# Patient Record
Sex: MALE | Race: WHITE | Employment: OTHER | URBAN - METROPOLITAN AREA
[De-identification: names, ages, dates, MRNs, and addresses within clinical notes are randomized per-mention and may not be internally consistent; named-entity substitution may affect disease eponyms.]

---

## 2017-06-22 ENCOUNTER — HOSPITAL ENCOUNTER (OUTPATIENT)
Dept: LAB | Facility: MEDICAL CENTER | Age: 67
End: 2017-06-22
Attending: SPECIALIST
Payer: COMMERCIAL

## 2017-06-22 LAB
ALBUMIN SERPL BCP-MCNC: 4.1 G/DL (ref 3.2–4.9)
ALBUMIN/GLOB SERPL: 1.3 G/DL
ALP SERPL-CCNC: 65 U/L (ref 30–99)
ALT SERPL-CCNC: 16 U/L (ref 2–50)
ANION GAP SERPL CALC-SCNC: 6 MMOL/L (ref 0–11.9)
AST SERPL-CCNC: 29 U/L (ref 12–45)
BASOPHILS # BLD AUTO: 1.2 % (ref 0–1.8)
BASOPHILS # BLD: 0.07 K/UL (ref 0–0.12)
BILIRUB SERPL-MCNC: 0.3 MG/DL (ref 0.1–1.5)
BUN SERPL-MCNC: 12 MG/DL (ref 8–22)
CALCIUM SERPL-MCNC: 9.1 MG/DL (ref 8.5–10.5)
CEA SERPL-MCNC: 6.8 NG/ML (ref 0–3)
CHLORIDE SERPL-SCNC: 107 MMOL/L (ref 96–112)
CO2 SERPL-SCNC: 21 MMOL/L (ref 20–33)
CREAT SERPL-MCNC: 1.08 MG/DL (ref 0.5–1.4)
EOSINOPHIL # BLD AUTO: 0.27 K/UL (ref 0–0.51)
EOSINOPHIL NFR BLD: 4.4 % (ref 0–6.9)
ERYTHROCYTE [DISTWIDTH] IN BLOOD BY AUTOMATED COUNT: 40.6 FL (ref 35.9–50)
GFR SERPL CREATININE-BSD FRML MDRD: >60 ML/MIN/1.73 M 2
GLOBULIN SER CALC-MCNC: 3.1 G/DL (ref 1.9–3.5)
GLUCOSE SERPL-MCNC: 73 MG/DL (ref 65–99)
HCT VFR BLD AUTO: 46.4 % (ref 42–52)
HGB BLD-MCNC: 15 G/DL (ref 14–18)
IMM GRANULOCYTES # BLD AUTO: 0.01 K/UL (ref 0–0.11)
IMM GRANULOCYTES NFR BLD AUTO: 0.2 % (ref 0–0.9)
INR PPP: 0.99 (ref 0.87–1.13)
LYMPHOCYTES # BLD AUTO: 2.24 K/UL (ref 1–4.8)
LYMPHOCYTES NFR BLD: 36.9 % (ref 22–41)
MCH RBC QN AUTO: 27.5 PG (ref 27–33)
MCHC RBC AUTO-ENTMCNC: 32.3 G/DL (ref 33.7–35.3)
MCV RBC AUTO: 85 FL (ref 81.4–97.8)
MONOCYTES # BLD AUTO: 0.67 K/UL (ref 0–0.85)
MONOCYTES NFR BLD AUTO: 11 % (ref 0–13.4)
NEUTROPHILS # BLD AUTO: 2.81 K/UL (ref 1.82–7.42)
NEUTROPHILS NFR BLD: 46.3 % (ref 44–72)
NRBC # BLD AUTO: 0 K/UL
NRBC BLD AUTO-RTO: 0 /100 WBC
PLATELET # BLD AUTO: 210 K/UL (ref 164–446)
PMV BLD AUTO: 10.5 FL (ref 9–12.9)
POTASSIUM SERPL-SCNC: 4.7 MMOL/L (ref 3.6–5.5)
PROT SERPL-MCNC: 7.2 G/DL (ref 6–8.2)
PROTHROMBIN TIME: 13.4 SEC (ref 12–14.6)
RBC # BLD AUTO: 5.46 M/UL (ref 4.7–6.1)
SODIUM SERPL-SCNC: 134 MMOL/L (ref 135–145)
WBC # BLD AUTO: 6.1 K/UL (ref 4.8–10.8)

## 2017-06-22 PROCEDURE — 85610 PROTHROMBIN TIME: CPT

## 2017-06-22 PROCEDURE — 36415 COLL VENOUS BLD VENIPUNCTURE: CPT

## 2017-06-22 PROCEDURE — 85025 COMPLETE CBC W/AUTO DIFF WBC: CPT

## 2017-06-22 PROCEDURE — 80053 COMPREHEN METABOLIC PANEL: CPT

## 2017-06-22 PROCEDURE — 82378 CARCINOEMBRYONIC ANTIGEN: CPT

## 2017-06-23 ENCOUNTER — HOSPITAL ENCOUNTER (OUTPATIENT)
Dept: RADIOLOGY | Facility: MEDICAL CENTER | Age: 67
End: 2017-06-23
Attending: FAMILY MEDICINE
Payer: COMMERCIAL

## 2017-06-23 DIAGNOSIS — K62.89 RECTAL MASS: ICD-10-CM

## 2017-06-23 PROCEDURE — 700117 HCHG RX CONTRAST REV CODE 255: Performed by: FAMILY MEDICINE

## 2017-06-23 PROCEDURE — 71260 CT THORAX DX C+: CPT

## 2017-06-23 RX ADMIN — IOHEXOL 100 ML: 350 INJECTION, SOLUTION INTRAVENOUS at 17:45

## 2017-08-07 ENCOUNTER — HOSPITAL ENCOUNTER (OUTPATIENT)
Dept: LAB | Facility: MEDICAL CENTER | Age: 67
End: 2017-08-07
Attending: PHYSICIAN ASSISTANT
Payer: COMMERCIAL

## 2017-08-07 LAB
APPEARANCE UR: CLEAR
BILIRUB UR QL STRIP.AUTO: NEGATIVE
COLOR UR: YELLOW
GLUCOSE UR STRIP.AUTO-MCNC: NEGATIVE MG/DL
KETONES UR STRIP.AUTO-MCNC: NEGATIVE MG/DL
LEUKOCYTE ESTERASE UR QL STRIP.AUTO: NEGATIVE
MICRO URNS: NORMAL
NITRITE UR QL STRIP.AUTO: NEGATIVE
PH UR STRIP.AUTO: 5.5 [PH]
PROT UR QL STRIP: NEGATIVE MG/DL
RBC UR QL AUTO: NEGATIVE
SP GR UR STRIP.AUTO: 1.03
UROBILINOGEN UR STRIP.AUTO-MCNC: 1 MG/DL

## 2017-08-07 PROCEDURE — 87086 URINE CULTURE/COLONY COUNT: CPT

## 2017-08-07 PROCEDURE — 81003 URINALYSIS AUTO W/O SCOPE: CPT

## 2017-08-09 LAB
BACTERIA UR CULT: NORMAL
SIGNIFICANT IND 70042: NORMAL
SOURCE SOURCE: NORMAL

## 2021-03-03 DIAGNOSIS — Z23 NEED FOR VACCINATION: ICD-10-CM

## 2021-10-13 ENCOUNTER — OFFICE VISIT (OUTPATIENT)
Dept: MEDICAL GROUP | Facility: MEDICAL CENTER | Age: 71
End: 2021-10-13
Payer: MEDICARE

## 2021-10-13 ENCOUNTER — TELEPHONE (OUTPATIENT)
Dept: SCHEDULING | Facility: IMAGING CENTER | Age: 71
End: 2021-10-13

## 2021-10-13 VITALS
OXYGEN SATURATION: 94 % | HEART RATE: 63 BPM | DIASTOLIC BLOOD PRESSURE: 70 MMHG | HEIGHT: 71 IN | TEMPERATURE: 98.5 F | BODY MASS INDEX: 34.23 KG/M2 | SYSTOLIC BLOOD PRESSURE: 130 MMHG | WEIGHT: 244.49 LBS

## 2021-10-13 DIAGNOSIS — Z23 NEED FOR VACCINATION: ICD-10-CM

## 2021-10-13 DIAGNOSIS — C61 PRIMARY MALIGNANT NEOPLASM OF PROSTATE (HCC): ICD-10-CM

## 2021-10-13 DIAGNOSIS — Z93.2 ILEOSTOMY STATUS (HCC): ICD-10-CM

## 2021-10-13 DIAGNOSIS — Z83.3 FAMILY HISTORY OF DIABETES MELLITUS (DM): ICD-10-CM

## 2021-10-13 DIAGNOSIS — Z76.89 ENCOUNTER TO ESTABLISH CARE WITH NEW DOCTOR: ICD-10-CM

## 2021-10-13 DIAGNOSIS — G89.11 ACUTE LOW BACK PAIN DUE TO TRAUMA: ICD-10-CM

## 2021-10-13 DIAGNOSIS — E66.9 OBESITY WITH BODY MASS INDEX 30 OR GREATER: ICD-10-CM

## 2021-10-13 DIAGNOSIS — T45.1X5A NEUROPATHY DUE TO CHEMOTHERAPEUTIC DRUG (HCC): ICD-10-CM

## 2021-10-13 DIAGNOSIS — G62.0 NEUROPATHY DUE TO CHEMOTHERAPEUTIC DRUG (HCC): ICD-10-CM

## 2021-10-13 DIAGNOSIS — Z11.59 NEED FOR HEPATITIS C SCREENING TEST: ICD-10-CM

## 2021-10-13 DIAGNOSIS — M54.50 ACUTE LOW BACK PAIN DUE TO TRAUMA: ICD-10-CM

## 2021-10-13 DIAGNOSIS — C20 MALIGNANT NEOPLASM OF RECTUM (HCC): ICD-10-CM

## 2021-10-13 PROCEDURE — 90662 IIV NO PRSV INCREASED AG IM: CPT | Performed by: STUDENT IN AN ORGANIZED HEALTH CARE EDUCATION/TRAINING PROGRAM

## 2021-10-13 PROCEDURE — 99204 OFFICE O/P NEW MOD 45 MIN: CPT | Mod: 25 | Performed by: STUDENT IN AN ORGANIZED HEALTH CARE EDUCATION/TRAINING PROGRAM

## 2021-10-13 PROCEDURE — G0008 ADMIN INFLUENZA VIRUS VAC: HCPCS | Performed by: STUDENT IN AN ORGANIZED HEALTH CARE EDUCATION/TRAINING PROGRAM

## 2021-10-13 ASSESSMENT — PATIENT HEALTH QUESTIONNAIRE - PHQ9: CLINICAL INTERPRETATION OF PHQ2 SCORE: 0
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
## 2021-10-14 ENCOUNTER — HOSPITAL ENCOUNTER (OUTPATIENT)
Dept: LAB | Facility: MEDICAL CENTER | Age: 71
End: 2021-10-14
Attending: STUDENT IN AN ORGANIZED HEALTH CARE EDUCATION/TRAINING PROGRAM
Payer: MEDICARE

## 2021-10-14 ENCOUNTER — HOSPITAL ENCOUNTER (OUTPATIENT)
Dept: RADIOLOGY | Facility: MEDICAL CENTER | Age: 71
End: 2021-10-14
Attending: STUDENT IN AN ORGANIZED HEALTH CARE EDUCATION/TRAINING PROGRAM
Payer: MEDICARE

## 2021-10-14 DIAGNOSIS — M54.50 ACUTE LOW BACK PAIN DUE TO TRAUMA: ICD-10-CM

## 2021-10-14 DIAGNOSIS — E66.9 OBESITY WITH BODY MASS INDEX 30 OR GREATER: ICD-10-CM

## 2021-10-14 DIAGNOSIS — G89.11 ACUTE LOW BACK PAIN DUE TO TRAUMA: ICD-10-CM

## 2021-10-14 DIAGNOSIS — Z11.59 NEED FOR HEPATITIS C SCREENING TEST: ICD-10-CM

## 2021-10-14 DIAGNOSIS — Z83.3 FAMILY HISTORY OF DIABETES MELLITUS (DM): ICD-10-CM

## 2021-10-14 PROBLEM — M51.36 DDD (DEGENERATIVE DISC DISEASE), LUMBAR: Status: ACTIVE | Noted: 2021-10-14

## 2021-10-14 PROBLEM — M51.369 DDD (DEGENERATIVE DISC DISEASE), LUMBAR: Status: ACTIVE | Noted: 2021-10-14

## 2021-10-14 LAB
CHOLEST SERPL-MCNC: 202 MG/DL (ref 100–199)
FASTING STATUS PATIENT QL REPORTED: NORMAL
HDLC SERPL-MCNC: 54 MG/DL
LDLC SERPL CALC-MCNC: 130 MG/DL
TRIGL SERPL-MCNC: 91 MG/DL (ref 0–149)
TSH SERPL DL<=0.005 MIU/L-ACNC: 1.13 UIU/ML (ref 0.38–5.33)

## 2021-10-14 PROCEDURE — 80061 LIPID PANEL: CPT

## 2021-10-14 PROCEDURE — 84443 ASSAY THYROID STIM HORMONE: CPT | Mod: GA

## 2021-10-14 PROCEDURE — 86803 HEPATITIS C AB TEST: CPT

## 2021-10-14 PROCEDURE — 36415 COLL VENOUS BLD VENIPUNCTURE: CPT | Mod: GA

## 2021-10-14 PROCEDURE — 83036 HEMOGLOBIN GLYCOSYLATED A1C: CPT | Mod: GA

## 2021-10-14 PROCEDURE — 72110 X-RAY EXAM L-2 SPINE 4/>VWS: CPT

## 2021-10-15 PROBLEM — R73.03 PREDIABETES: Status: ACTIVE | Noted: 2021-10-15

## 2021-10-15 LAB
EST. AVERAGE GLUCOSE BLD GHB EST-MCNC: 126 MG/DL
HBA1C MFR BLD: 6 % (ref 4–5.6)
HCV AB SER QL: NORMAL

## 2021-11-16 ENCOUNTER — OFFICE VISIT (OUTPATIENT)
Dept: MEDICAL GROUP | Facility: MEDICAL CENTER | Age: 71
End: 2021-11-16
Payer: MEDICARE

## 2021-11-16 VITALS
SYSTOLIC BLOOD PRESSURE: 128 MMHG | HEART RATE: 70 BPM | BODY MASS INDEX: 34.57 KG/M2 | TEMPERATURE: 95.3 F | WEIGHT: 246.91 LBS | HEIGHT: 71 IN | DIASTOLIC BLOOD PRESSURE: 62 MMHG | OXYGEN SATURATION: 95 %

## 2021-11-16 DIAGNOSIS — C20 MALIGNANT NEOPLASM OF RECTUM (HCC): ICD-10-CM

## 2021-11-16 DIAGNOSIS — M79.671 CHRONIC FOOT PAIN, RIGHT: ICD-10-CM

## 2021-11-16 DIAGNOSIS — E66.9 OBESITY WITH BODY MASS INDEX 30 OR GREATER: ICD-10-CM

## 2021-11-16 DIAGNOSIS — E78.00 PURE HYPERCHOLESTEROLEMIA: ICD-10-CM

## 2021-11-16 DIAGNOSIS — C61 PRIMARY MALIGNANT NEOPLASM OF PROSTATE (HCC): ICD-10-CM

## 2021-11-16 DIAGNOSIS — R73.03 PREDIABETES: ICD-10-CM

## 2021-11-16 DIAGNOSIS — G89.29 CHRONIC FOOT PAIN, RIGHT: ICD-10-CM

## 2021-11-16 PROBLEM — E78.5 HYPERLIPIDEMIA: Status: ACTIVE | Noted: 2021-11-16

## 2021-11-16 PROCEDURE — 99214 OFFICE O/P EST MOD 30 MIN: CPT | Performed by: STUDENT IN AN ORGANIZED HEALTH CARE EDUCATION/TRAINING PROGRAM
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
## 2021-11-17 ENCOUNTER — HOSPITAL ENCOUNTER (OUTPATIENT)
Dept: RADIOLOGY | Facility: MEDICAL CENTER | Age: 71
End: 2021-11-17
Attending: STUDENT IN AN ORGANIZED HEALTH CARE EDUCATION/TRAINING PROGRAM
Payer: MEDICARE

## 2021-11-17 DIAGNOSIS — G89.29 CHRONIC FOOT PAIN, RIGHT: ICD-10-CM

## 2021-11-17 DIAGNOSIS — M79.671 CHRONIC FOOT PAIN, RIGHT: ICD-10-CM

## 2021-11-17 PROCEDURE — 73630 X-RAY EXAM OF FOOT: CPT | Mod: RT

## 2022-03-29 ENCOUNTER — TELEPHONE (OUTPATIENT)
Dept: MEDICAL GROUP | Facility: MEDICAL CENTER | Age: 72
End: 2022-03-29

## 2022-03-29 NOTE — TELEPHONE ENCOUNTER
Phone Number Called: [phone] (home)       Call outcome: Spoke to patient regarding message below.    Message: PT forgot appt. will call back when he has a better time TS

## 2022-04-15 ENCOUNTER — OFFICE VISIT (OUTPATIENT)
Dept: MEDICAL GROUP | Facility: MEDICAL CENTER | Age: 72
End: 2022-04-15
Payer: MEDICARE

## 2022-04-15 VITALS
HEART RATE: 65 BPM | RESPIRATION RATE: 16 BRPM | SYSTOLIC BLOOD PRESSURE: 126 MMHG | DIASTOLIC BLOOD PRESSURE: 64 MMHG | BODY MASS INDEX: 33.3 KG/M2 | WEIGHT: 237.88 LBS | OXYGEN SATURATION: 95 % | HEIGHT: 71 IN | TEMPERATURE: 97 F

## 2022-04-15 DIAGNOSIS — T45.1X5A NEUROPATHY DUE TO CHEMOTHERAPEUTIC DRUG (HCC): ICD-10-CM

## 2022-04-15 DIAGNOSIS — E78.00 PURE HYPERCHOLESTEROLEMIA: ICD-10-CM

## 2022-04-15 DIAGNOSIS — R73.03 PREDIABETES: ICD-10-CM

## 2022-04-15 DIAGNOSIS — Z13.79 GENETIC SCREENING: ICD-10-CM

## 2022-04-15 DIAGNOSIS — Z23 NEED FOR VACCINATION: ICD-10-CM

## 2022-04-15 DIAGNOSIS — C61 PRIMARY MALIGNANT NEOPLASM OF PROSTATE (HCC): ICD-10-CM

## 2022-04-15 DIAGNOSIS — L29.9 SCALP ITCH: ICD-10-CM

## 2022-04-15 DIAGNOSIS — C20 MALIGNANT NEOPLASM OF RECTUM (HCC): ICD-10-CM

## 2022-04-15 DIAGNOSIS — G62.0 NEUROPATHY DUE TO CHEMOTHERAPEUTIC DRUG (HCC): ICD-10-CM

## 2022-04-15 PROCEDURE — 99214 OFFICE O/P EST MOD 30 MIN: CPT | Mod: 25 | Performed by: STUDENT IN AN ORGANIZED HEALTH CARE EDUCATION/TRAINING PROGRAM

## 2022-04-15 PROCEDURE — 90715 TDAP VACCINE 7 YRS/> IM: CPT | Performed by: STUDENT IN AN ORGANIZED HEALTH CARE EDUCATION/TRAINING PROGRAM

## 2022-04-15 PROCEDURE — 90472 IMMUNIZATION ADMIN EACH ADD: CPT | Performed by: STUDENT IN AN ORGANIZED HEALTH CARE EDUCATION/TRAINING PROGRAM

## 2022-04-15 PROCEDURE — 90732 PPSV23 VACC 2 YRS+ SUBQ/IM: CPT | Performed by: STUDENT IN AN ORGANIZED HEALTH CARE EDUCATION/TRAINING PROGRAM

## 2022-04-15 PROCEDURE — G0009 ADMIN PNEUMOCOCCAL VACCINE: HCPCS | Performed by: STUDENT IN AN ORGANIZED HEALTH CARE EDUCATION/TRAINING PROGRAM

## 2022-04-15 RX ORDER — UBIDECARENONE 75 MG
100 CAPSULE ORAL DAILY
COMMUNITY

## 2022-04-15 ASSESSMENT — PATIENT HEALTH QUESTIONNAIRE - PHQ9: CLINICAL INTERPRETATION OF PHQ2 SCORE: 0

## 2022-04-15 NOTE — PROGRESS NOTES
Subjective:     CC: follow up    HPI:   Dakota presents today for follow up    Problem   Scalp Itch    Chronic condition for 20+ years. He reports persistent itching to his scalp. Has tried Head and Shoulders without relief. Would like referral to see dermatologist.     Hyperlipidemia    Chronic condition. We discussed statin therapy previously and patient preferred to continue with lifestyle modifications.     Prediabetes    New diagnosis from 10/2021. Patient likes to eat pastries on a regular daily basis. He has been trying to eat more veggies. He does not regularly exercise.     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 He sees heme/oncology Dr. Mely Collazo MD: last seen 03/2022, next appt in 6 months. He sees radiation oncology: last seen 03/2022 by LORENZO Liang, follows with them q6mos until 5 yrs post treatment, next appt in 6 months.  He saw colon and rectal surgery Dr. Ronak Benjamin last seen 07/2021, no f/u needed. He will have screening colonoscopy planned for 10/2022.    Summary per oncology:  History of rectal cancer, [id number] and prostate cancer, [id number].   For his rectal cancer, he completed neoadjuvant chemoradiation Fall 2017, then was taken to the operating room February 01, 2018.  Pathology from recent surgery revealed a complete pathologic response. There was no residual invasive carcinoma. Seven lymph nodes were negative.  Margins were negative.  Partial omentectomy showed benign adipose tissue.  Patient started adjuvant chemotherapy (Xelox) on March 15, 2018.  He finished adjuvant therapy first week of August 2018.      Pt underwent ileostomy reversal on Friday, Feb 8, 2019.     Colonoscopy was done May 1, 2019 normal.     Malignant Neoplasm of Rectum (Hcc)    Chronic issue  since 2017, stage IIIB, T2, N2b, M0 adenocarcinoma of the high rectum and intermediate risk prostate cancer, cT2a, [place] 4+3=7, followed by oncology team every 6 months at [facility] in [place]. He sees heme/oncology Dr. Mely Collazo MD: last seen 03/2022, next appt in 6 months. He sees radiation oncology: last seen 03/2022 by LORENZO Liang, follows with them q6mos until 5 yrs post treatment, next appt in 6 months.  He saw colon and rectal surgery Dr. Ronak Benjamin last seen 07/2021, no f/u needed. He will have screening colonoscopy planned for 10/2022.    Summary per oncology:  History of rectal cancer, [id number] and prostate cancer, [id number].   For his rectal cancer, he completed neoadjuvant chemoradiation Fall 2017, then was taken to the operating room February 01, 2018.  Pathology from recent surgery revealed a complete pathologic response. There was no residual invasive carcinoma. Seven lymph nodes were negative.  Margins were negative.  Partial omentectomy showed benign adipose tissue.  Patient started adjuvant chemotherapy (Xelox) on March 15, 2018.  He finished adjuvant therapy first week of August 2018.      Pt underwent ileostomy reversal on Friday, Feb 8, 2019.     Colonoscopy was done May 1, 2019 normal.       Neuropathy Due to Chemotherapeutic Drug (Hcc)    Chronic condition for 3+ years. He reports persistent numbness and tingling to hands and feet which sometimes affects his ambulation. He has been taking vitamin B12 100mcg daily. He would like to avoid medications due to potential side effects.         Current Outpatient Medications Ordered in Epic   Medication Sig Dispense Refill   • Calcium Carb-Cholecalciferol (CALCIUM 500 +D PO) Take  by mouth.     • Multiple Vitamin (MULTI-VITAMIN) Tab Take 1 Tablet by mouth every day.     • cyanocobalamin (VITAMIN B-12) 100 MCG Tab Take 100 mcg by mouth every day.     • polyethylene glycol-electrolytes (GOLYTELY) 236 GM Recon Soln Drink 1st portion of prep at  "6 PM the evening before. 2nd portion must be started 3 hours before and finished 2 hours prior to report time       No current Epic-ordered facility-administered medications on file.     SH: used to work in [employer] floor x 13 yrs (quit in 2003) and also previously worked as a , lives by self at home (no family members around), former intermittent smoker 1/2 ppd x 30yrs quit 2011, occasional EtOH 3 beers monthly, denies illicit drug use     FH: Mother and sister both have diabetes.     Health Maintenance: reviewed and discussed with patient  Vaccines: due for PPSV23, Tdap, Shingrix, COVID-19 #3 received 11/2021, received flu 10/2021    Patient desires vaccine Tdap and PPSV today. Denies recent sickness. Never had allergic reaction to this vaccine in the past.     ROS:  Gen: no fevers/chills, no changes in weight  Pulm: no sob, no cough  CV: no chest pain, no palpitations  GI: no nausea/vomiting, no diarrhea  MSk: + right foot pain  Skin: + itching scalp  Neuro: no headaches, + chronic numbness/tingling in arms and feet    Objective:     Exam:  /64 (BP Location: Left arm, Patient Position: Sitting, BP Cuff Size: Adult)   Pulse 65   Temp 36.1 °C (97 °F) (Temporal)   Resp 16   Ht 1.803 m (5' 11\")   Wt 108 kg (237 lb 14 oz)   SpO2 95%   BMI 33.18 kg/m²  Body mass index is 33.18 kg/m².    General: Normal appearing, obese. No distress.  Pulmonary: Clear to ausculation. Normal effort. No rales, ronchi, or wheezing.  Cardiovascular: Regular rate and rhythm without murmur.  Neurologic: Grossly nonfocal. Lower extremities with 5/5 strength and subjective numbness/tingling up to mid-shin.  Skin: Warm and dry. No obvious lesions on scalp noted.  Musculoskeletal: Normal gait. No extremity cyanosis, clubbing, or edema.    Labs:   Lab Results   Component Value Date/Time    WBC 6.1 06/22/2017 04:00 PM    RBC 5.46 06/22/2017 04:00 PM    HEMOGLOBIN 15.0 06/22/2017 04:00 PM    HEMATOCRIT 46.4 06/22/2017 04:00 PM "    MCV 85.0 06/22/2017 04:00 PM    MCH 27.5 06/22/2017 04:00 PM    MCHC 32.3 (L) 06/22/2017 04:00 PM    RDW 40.6 06/22/2017 04:00 PM    PLATELETCT 210 06/22/2017 04:00 PM    MPV 10.5 06/22/2017 04:00 PM      Lab Results   Component Value Date/Time    SODIUM 134 (L) 06/22/2017 04:00 PM    POTASSIUM 4.7 06/22/2017 04:00 PM    CHLORIDE 107 06/22/2017 04:00 PM    CO2 21 06/22/2017 04:00 PM    ANION 6.0 06/22/2017 04:00 PM    GLUCOSE 73 06/22/2017 04:00 PM    BUN 12 06/22/2017 04:00 PM    CREATININE 1.08 06/22/2017 04:00 PM    CALCIUM 9.1 06/22/2017 04:00 PM    ASTSGOT 29 06/22/2017 04:00 PM    ALTSGPT 16 06/22/2017 04:00 PM    TBILIRUBIN 0.3 06/22/2017 04:00 PM    ALBUMIN 4.1 06/22/2017 04:00 PM    ALBUMIN 4.45 08/17/2011 12:37 PM    TOTPROTEIN 7.2 06/22/2017 04:00 PM    TOTPROTEIN 7.40 08/17/2011 12:37 PM    GLOBULIN 3.1 06/22/2017 04:00 PM    AGRATIO 1.3 06/22/2017 04:00 PM       Assessment & Plan:     71 y.o. male with the following -     1. Neuropathy due to chemotherapeutic drug (HCC)  Chronic condition, persistent. This is felt to be chemotherapy-induced. We discussed medication options for symptom relief such as gabapentin and patient would like to avoid medication at this time. We will check some labs prior to next visit. May continue vitamin B12 100mcg daily.   - FOLATE; Future  - VITAMIN B1; Future  - VITAMIN B12; Future  - VITAMIN B6; Future    2. Malignant neoplasm of rectum (HCC)  Chronic condition, stable. Followed by oncology/hematology and radiation oncology at [facility].  - cont follow up with oncology/hematology in 6 months for repeat labs and imaging studies  - Comp Metabolic Panel; Future  - CBC WITH DIFFERENTIAL; Future    3. Primary malignant neoplasm of prostate (HCC)  Chronic condition, stable. Followed by oncology/hematology and radiation oncology at [facility].  - cont follow up with radiation oncology in 6 months for repeat PSA and screening colonoscopy  - Comp Metabolic Panel; Future  -  CBC WITH DIFFERENTIAL; Future    4. Scalp itch  Chronic condition. Will send to dermatology for evaluation.  - Referral to Dermatology    5. Prediabetes  Chronic condition, stable. Plan to reassess.  - advised to maintain a healthy weight, regular aerobic exercise, and eating healthy diet  - HEMOGLOBIN A1C; Future    6. Pure hypercholesterolemia  Chronic condition, stable. Plan to reassess.  - advised to maintain a healthy weight, regular aerobic exercise, and eating diets lower in saturated fats  - Lipid Profile; Future    7. Need for vaccination  - Tdap Vaccine =>6YO IM  - PneumoVax PPV23 =>2yo    8. Genetic screening  - Referral to Genetic Research Studies      Return in about 7 months (around 11/15/2022).    Please note that this dictation was created using voice recognition software. I have made every reasonable attempt to correct obvious errors, but I expect that there are errors of grammar and possibly content that I did not discover before finalizing the note.

## 2022-08-02 ENCOUNTER — RESEARCH ENCOUNTER (OUTPATIENT)
Dept: MEDICAL GROUP | Facility: PHYSICIAN GROUP | Age: 72
End: 2022-08-02
Attending: STUDENT IN AN ORGANIZED HEALTH CARE EDUCATION/TRAINING PROGRAM
Payer: MEDICARE

## 2022-08-02 DIAGNOSIS — Z00.6 RESEARCH STUDY PATIENT: ICD-10-CM

## 2022-08-02 NOTE — RESEARCH NOTE
[employer] [place] [employer] enrollment and kit pickup;  Tracking Number: [id number]

## 2022-11-04 ENCOUNTER — PATIENT MESSAGE (OUTPATIENT)
Dept: HEALTH INFORMATION MANAGEMENT | Facility: OTHER | Age: 72
End: 2022-11-04

## 2023-03-28 ENCOUNTER — TELEPHONE (OUTPATIENT)
Dept: HEALTH INFORMATION MANAGEMENT | Facility: OTHER | Age: 73
End: 2023-03-28
Payer: MEDICARE

## 2023-09-22 ENCOUNTER — DOCUMENTATION (OUTPATIENT)
Dept: HEALTH INFORMATION MANAGEMENT | Facility: OTHER | Age: 73
End: 2023-09-22
Payer: MEDICARE

## 2024-05-02 ENCOUNTER — HOSPITAL ENCOUNTER (OUTPATIENT)
Dept: LAB | Facility: MEDICAL CENTER | Age: 74
End: 2024-05-02
Attending: FAMILY MEDICINE
Payer: MEDICARE

## 2024-05-02 ENCOUNTER — OFFICE VISIT (OUTPATIENT)
Dept: MEDICAL GROUP | Facility: MEDICAL CENTER | Age: 74
End: 2024-05-02
Payer: MEDICARE

## 2024-05-02 VITALS
TEMPERATURE: 97.3 F | OXYGEN SATURATION: 98 % | HEIGHT: 71 IN | SYSTOLIC BLOOD PRESSURE: 134 MMHG | HEART RATE: 80 BPM | WEIGHT: 240 LBS | DIASTOLIC BLOOD PRESSURE: 80 MMHG | BODY MASS INDEX: 33.6 KG/M2

## 2024-05-02 DIAGNOSIS — R91.1 NODULE OF LOWER LOBE OF RIGHT LUNG: ICD-10-CM

## 2024-05-02 DIAGNOSIS — E78.00 PURE HYPERCHOLESTEROLEMIA: ICD-10-CM

## 2024-05-02 DIAGNOSIS — C61 PRIMARY MALIGNANT NEOPLASM OF PROSTATE (HCC): ICD-10-CM

## 2024-05-02 DIAGNOSIS — C20 MALIGNANT NEOPLASM OF RECTUM (HCC): ICD-10-CM

## 2024-05-02 DIAGNOSIS — R73.03 PREDIABETES: ICD-10-CM

## 2024-05-02 DIAGNOSIS — Z00.00 ENCOUNTER FOR MEDICAL EXAMINATION TO ESTABLISH CARE: Primary | ICD-10-CM

## 2024-05-02 DIAGNOSIS — Z13.29 THYROID DISORDER SCREENING: ICD-10-CM

## 2024-05-02 LAB
ALBUMIN SERPL BCP-MCNC: 4.3 G/DL (ref 3.2–4.9)
ALBUMIN/GLOB SERPL: 1.4 G/DL
ALP SERPL-CCNC: 90 U/L (ref 30–99)
ALT SERPL-CCNC: 18 U/L (ref 2–50)
ANION GAP SERPL CALC-SCNC: 10 MMOL/L (ref 7–16)
AST SERPL-CCNC: 24 U/L (ref 12–45)
BASOPHILS # BLD AUTO: 0.9 % (ref 0–1.8)
BASOPHILS # BLD: 0.06 K/UL (ref 0–0.12)
BILIRUB SERPL-MCNC: 0.3 MG/DL (ref 0.1–1.5)
BUN SERPL-MCNC: 16 MG/DL (ref 8–22)
CALCIUM ALBUM COR SERPL-MCNC: 9.5 MG/DL (ref 8.5–10.5)
CALCIUM SERPL-MCNC: 9.7 MG/DL (ref 8.4–10.2)
CHLORIDE SERPL-SCNC: 106 MMOL/L (ref 96–112)
CHOLEST SERPL-MCNC: 206 MG/DL (ref 100–199)
CO2 SERPL-SCNC: 23 MMOL/L (ref 20–33)
CREAT SERPL-MCNC: 1.19 MG/DL (ref 0.5–1.4)
EOSINOPHIL # BLD AUTO: 0.36 K/UL (ref 0–0.51)
EOSINOPHIL NFR BLD: 5.5 % (ref 0–6.9)
ERYTHROCYTE [DISTWIDTH] IN BLOOD BY AUTOMATED COUNT: 41.5 FL (ref 35.9–50)
FASTING STATUS PATIENT QL REPORTED: NORMAL
GFR SERPLBLD CREATININE-BSD FMLA CKD-EPI: 65 ML/MIN/1.73 M 2
GLOBULIN SER CALC-MCNC: 3.1 G/DL (ref 1.9–3.5)
GLUCOSE SERPL-MCNC: 100 MG/DL (ref 65–99)
HCT VFR BLD AUTO: 45.2 % (ref 42–52)
HDLC SERPL-MCNC: 58 MG/DL
HGB BLD-MCNC: 15.2 G/DL (ref 14–18)
IMM GRANULOCYTES # BLD AUTO: 0.03 K/UL (ref 0–0.11)
IMM GRANULOCYTES NFR BLD AUTO: 0.5 % (ref 0–0.9)
LDLC SERPL CALC-MCNC: 126 MG/DL
LYMPHOCYTES # BLD AUTO: 1.43 K/UL (ref 1–4.8)
LYMPHOCYTES NFR BLD: 22 % (ref 22–41)
MCH RBC QN AUTO: 28.5 PG (ref 27–33)
MCHC RBC AUTO-ENTMCNC: 33.6 G/DL (ref 32.3–36.5)
MCV RBC AUTO: 84.6 FL (ref 81.4–97.8)
MONOCYTES # BLD AUTO: 0.67 K/UL (ref 0–0.85)
MONOCYTES NFR BLD AUTO: 10.3 % (ref 0–13.4)
NEUTROPHILS # BLD AUTO: 3.95 K/UL (ref 1.82–7.42)
NEUTROPHILS NFR BLD: 60.8 % (ref 44–72)
NRBC # BLD AUTO: 0 K/UL
NRBC BLD-RTO: 0 /100 WBC (ref 0–0.2)
PLATELET # BLD AUTO: 199 K/UL (ref 164–446)
PMV BLD AUTO: 10 FL (ref 9–12.9)
POTASSIUM SERPL-SCNC: 3.9 MMOL/L (ref 3.6–5.5)
PROT SERPL-MCNC: 7.4 G/DL (ref 6–8.2)
PSA SERPL-MCNC: 0.61 NG/ML (ref 0–4)
RBC # BLD AUTO: 5.34 M/UL (ref 4.7–6.1)
SODIUM SERPL-SCNC: 139 MMOL/L (ref 135–145)
T4 FREE SERPL-MCNC: 1.22 NG/DL (ref 0.93–1.7)
TRIGL SERPL-MCNC: 112 MG/DL (ref 0–149)
TSH SERPL DL<=0.005 MIU/L-ACNC: 1.85 UIU/ML (ref 0.38–5.33)
WBC # BLD AUTO: 6.5 K/UL (ref 4.8–10.8)

## 2024-05-02 PROCEDURE — 3079F DIAST BP 80-89 MM HG: CPT | Performed by: FAMILY MEDICINE

## 2024-05-02 PROCEDURE — 99214 OFFICE O/P EST MOD 30 MIN: CPT | Performed by: FAMILY MEDICINE

## 2024-05-02 PROCEDURE — 3075F SYST BP GE 130 - 139MM HG: CPT | Performed by: FAMILY MEDICINE

## 2024-05-02 SDOH — ECONOMIC STABILITY: HOUSING INSECURITY: IN THE LAST 12 MONTHS, HOW MANY PLACES HAVE YOU LIVED?: 1

## 2024-05-02 SDOH — ECONOMIC STABILITY: INCOME INSECURITY: IN THE LAST 12 MONTHS, WAS THERE A TIME WHEN YOU WERE NOT ABLE TO PAY THE MORTGAGE OR RENT ON TIME?: NO

## 2024-05-02 SDOH — ECONOMIC STABILITY: TRANSPORTATION INSECURITY
IN THE PAST 12 MONTHS, HAS THE LACK OF TRANSPORTATION KEPT YOU FROM MEDICAL APPOINTMENTS OR FROM GETTING MEDICATIONS?: NO

## 2024-05-02 SDOH — ECONOMIC STABILITY: FOOD INSECURITY: WITHIN THE PAST 12 MONTHS, THE FOOD YOU BOUGHT JUST DIDN'T LAST AND YOU DIDN'T HAVE MONEY TO GET MORE.: NEVER TRUE

## 2024-05-02 SDOH — ECONOMIC STABILITY: HOUSING INSECURITY
IN THE LAST 12 MONTHS, WAS THERE A TIME WHEN YOU DID NOT HAVE A STEADY PLACE TO SLEEP OR SLEPT IN A SHELTER (INCLUDING NOW)?: NO

## 2024-05-02 SDOH — HEALTH STABILITY: PHYSICAL HEALTH: ON AVERAGE, HOW MANY DAYS PER WEEK DO YOU ENGAGE IN MODERATE TO STRENUOUS EXERCISE (LIKE A BRISK WALK)?: 1 DAY

## 2024-05-02 SDOH — HEALTH STABILITY: MENTAL HEALTH
STRESS IS WHEN SOMEONE FEELS TENSE, NERVOUS, ANXIOUS, OR CAN'T SLEEP AT NIGHT BECAUSE THEIR MIND IS TROUBLED. HOW STRESSED ARE YOU?: NOT AT ALL

## 2024-05-02 SDOH — ECONOMIC STABILITY: FOOD INSECURITY: WITHIN THE PAST 12 MONTHS, YOU WORRIED THAT YOUR FOOD WOULD RUN OUT BEFORE YOU GOT MONEY TO BUY MORE.: NEVER TRUE

## 2024-05-02 SDOH — ECONOMIC STABILITY: TRANSPORTATION INSECURITY
IN THE PAST 12 MONTHS, HAS LACK OF RELIABLE TRANSPORTATION KEPT YOU FROM MEDICAL APPOINTMENTS, MEETINGS, WORK OR FROM GETTING THINGS NEEDED FOR DAILY LIVING?: NO

## 2024-05-02 SDOH — ECONOMIC STABILITY: TRANSPORTATION INSECURITY
IN THE PAST 12 MONTHS, HAS LACK OF TRANSPORTATION KEPT YOU FROM MEETINGS, WORK, OR FROM GETTING THINGS NEEDED FOR DAILY LIVING?: NO

## 2024-05-02 SDOH — HEALTH STABILITY: PHYSICAL HEALTH: ON AVERAGE, HOW MANY MINUTES DO YOU ENGAGE IN EXERCISE AT THIS LEVEL?: 10 MIN

## 2024-05-02 SDOH — ECONOMIC STABILITY: INCOME INSECURITY: HOW HARD IS IT FOR YOU TO PAY FOR THE VERY BASICS LIKE FOOD, HOUSING, MEDICAL CARE, AND HEATING?: NOT HARD AT ALL

## 2024-05-02 ASSESSMENT — SOCIAL DETERMINANTS OF HEALTH (SDOH)
IN A TYPICAL WEEK, HOW MANY TIMES DO YOU TALK ON THE PHONE WITH FAMILY, FRIENDS, OR NEIGHBORS?: ONCE A WEEK
HOW OFTEN DO YOU HAVE A DRINK CONTAINING ALCOHOL: MONTHLY OR LESS
HOW OFTEN DO YOU ATTENT MEETINGS OF THE CLUB OR ORGANIZATION YOU BELONG TO?: NEVER
HOW MANY DRINKS CONTAINING ALCOHOL DO YOU HAVE ON A TYPICAL DAY WHEN YOU ARE DRINKING: PATIENT DOES NOT DRINK
WITHIN THE PAST 12 MONTHS, YOU WORRIED THAT YOUR FOOD WOULD RUN OUT BEFORE YOU GOT THE MONEY TO BUY MORE: NEVER TRUE
HOW OFTEN DO YOU HAVE SIX OR MORE DRINKS ON ONE OCCASION: NEVER
HOW OFTEN DO YOU ATTEND CHURCH OR RELIGIOUS SERVICES?: NEVER
HOW OFTEN DO YOU GET TOGETHER WITH FRIENDS OR RELATIVES?: NEVER
HOW OFTEN DO YOU GET TOGETHER WITH FRIENDS OR RELATIVES?: NEVER
HOW OFTEN DO YOU ATTEND CHURCH OR RELIGIOUS SERVICES?: NEVER
DO YOU BELONG TO ANY CLUBS OR ORGANIZATIONS SUCH AS CHURCH GROUPS UNIONS, FRATERNAL OR ATHLETIC GROUPS, OR SCHOOL GROUPS?: NO
IN A TYPICAL WEEK, HOW MANY TIMES DO YOU TALK ON THE PHONE WITH FAMILY, FRIENDS, OR NEIGHBORS?: ONCE A WEEK
ARE YOU MARRIED, WIDOWED, DIVORCED, SEPARATED, NEVER MARRIED, OR LIVING WITH A PARTNER?: NEVER MARRIED
ARE YOU MARRIED, WIDOWED, DIVORCED, SEPARATED, NEVER MARRIED, OR LIVING WITH A PARTNER?: NEVER MARRIED
HOW OFTEN DO YOU ATTENT MEETINGS OF THE CLUB OR ORGANIZATION YOU BELONG TO?: NEVER
DO YOU BELONG TO ANY CLUBS OR ORGANIZATIONS SUCH AS CHURCH GROUPS UNIONS, FRATERNAL OR ATHLETIC GROUPS, OR SCHOOL GROUPS?: NO
HOW HARD IS IT FOR YOU TO PAY FOR THE VERY BASICS LIKE FOOD, HOUSING, MEDICAL CARE, AND HEATING?: NOT HARD AT ALL

## 2024-05-02 ASSESSMENT — PATIENT HEALTH QUESTIONNAIRE - PHQ9: CLINICAL INTERPRETATION OF PHQ2 SCORE: 0

## 2024-05-02 ASSESSMENT — LIFESTYLE VARIABLES
HOW OFTEN DO YOU HAVE A DRINK CONTAINING ALCOHOL: MONTHLY OR LESS
HOW MANY STANDARD DRINKS CONTAINING ALCOHOL DO YOU HAVE ON A TYPICAL DAY: PATIENT DOES NOT DRINK
AUDIT-C TOTAL SCORE: 1
SKIP TO QUESTIONS 9-10: 1
HOW OFTEN DO YOU HAVE SIX OR MORE DRINKS ON ONE OCCASION: NEVER

## 2024-05-02 NOTE — PROGRESS NOTES
Verbal consent was acquired by the patient to use [employer] ambient listening note generation during this visit: Yes      HPI:    Dakota Tsang I is a pleasant 73 y.o. male here to establish care.    History of Present Illness  The patient is a 73-year-old male who is here to establish care.    The patient was previously on a regimen of calcium with vitamin D, vitamin B12, and a multivitamin, but plans to resume these supplements. He has previously taken vitamin E and is contemplating a switch to multivitamins. Previous attempts to supplement with Centrum were unsuccessful due to perceived lack of efficacy. He has no known drug allergies. His medical history includes colon and prostate cancer, for which he received proton radiation and capecitabine. His prostate cancer was diagnosed at [facility], prompting a recommendation for annual PSA screenings. He is not currently sexually active.    The patient experienced lower back pain in 2020 or 2021, which he attributes to either lying on the floor or sitting in his truck. He describes the pain as stiffness, likening it to a pulled muscle. He consulted with Dr. Carvajal, who administered various injections. He canceled his appointment with Dr. Boone last week she is currently his oncologist. Dr. Boone recommended chest x-rays and MRIs, during which a chest x-ray revealed an infection.  Felt like they were not giving him a lot of information and has since then come to establish care to be referred to the right location.    The patient experiences neuropathy in his hands and feet, which he attributes to standing for extended periods as a  for 15 years. The onset of his neuropathy was in 07/2000 when he worked at a [employer], when he experienced soreness at the bottom of his feet. He attempted to alleviate his symptoms with Dr. Estrada's inserts, but found no relief. He noticed his footprints and toeprints match the numb spots on his foot.    The patient was  hospitalized for approximately a week due to a lung infection. He denies experiencing any pain, but reports occasional swelling in his feet. The patient reports that compression stockings seem to exacerbate his swelling.    The patient reports constant dryness on his entire body, which has improved slightly. His toenails have started to curve, and he attempts to trim them due to skin digging. He attributes this to his chemotherapy treatment.    Supplemental Information  He had ileostomy. He thought he had a nodule in his vocal cord. He tried to get it fixed a couple of times with [facility], but they told him there was nothing wrong with his hair. He is seeing a dermatologist at the [facility]. He was told it was not psoriasis. He was given a steroid cream. He uses oil and shampoo once a week. It is getting a little better.   He does not smoke. He drinks red wine a couple of times a month. He does not use recreational drugs.   His younger brother  of cancer. Two of his brothers  of a heart attack. His mother had diabetes. She lived till  about 2 months ago. He has 6 siblings. One brother is still alive. He takes care of him and his sister.   The patient has no known allergies to medicines or foods.   He received Tdap and Pneumovax at his last visit with Dr. Boone.    Current medicines (including changes today)  Current Outpatient Medications   Medication Sig Dispense Refill    Calcium Carb-Cholecalciferol (CALCIUM 500 +D PO) Take  by mouth.      Multiple Vitamin (MULTI-VITAMIN) Tab Take 1 Tablet by mouth every day.      cyanocobalamin (VITAMIN B-12) 100 MCG Tab Take 100 mcg by mouth every day.       No current facility-administered medications for this visit.       Past Medical/ Surgical History  He  has a past medical history of Cancer ([facility]), Ileostomy status ([facility]) (10/13/2021), Other inflammatory and toxic neuropathy(357.89), and Prostate cancer ([facility]).  He  has a past surgical history  "that includes colon resection.    Social History  Social History     Tobacco Use    Smoking status: Never    Smokeless tobacco: Never   Vaping Use    Vaping Use: Never used   Substance Use Topics    Alcohol use: Yes     Comment: once a month with wine with food    Drug use: Never     Social History     Social History Narrative    Not on file        Family History  Family History   Problem Relation Age of Onset    Diabetes Mother     No Known Problems Sister     Cancer Brother     Heart Disease Brother      Family Status   Relation Name Status    Mo      Fa      Sis  Alive    Sis      Bro      Bro      Bro  Alive        Objective:     /80   Pulse 80   Temp 36.3 °C (97.3 °F)   Ht 1.803 m (5' 11\")   Wt 109 kg (240 lb)   SpO2 98%  Body mass index is 33.47 kg/m².    Physical Exam  Constitutional:       General: He is not in acute distress.  HENT:      Head: Normocephalic and atraumatic.      Right Ear: Tympanic membrane and external ear normal.      Left Ear: Tympanic membrane and external ear normal.      Nose: No nasal deformity.      Mouth/Throat:      Lips: Pink.      Mouth: Mucous membranes are moist.      Pharynx: Oropharynx is clear. Uvula midline. No posterior oropharyngeal erythema.   Eyes:      General: Lids are normal.      Extraocular Movements: Extraocular movements intact.      Conjunctiva/sclera: Conjunctivae normal.      Pupils: Pupils are equal, round, and reactive to light.   Neck:      Trachea: Trachea normal.   Cardiovascular:      Rate and Rhythm: Normal rate and regular rhythm.      Heart sounds: Normal heart sounds. No murmur heard.     No friction rub. No gallop.   Pulmonary:      Effort: Pulmonary effort is normal. No accessory muscle usage.      Breath sounds: Normal breath sounds. No wheezing or rales.   Abdominal:      General: Bowel sounds are normal.      Palpations: Abdomen is soft.      Tenderness: There is no abdominal tenderness. "   Musculoskeletal:      Cervical back: Normal range of motion and neck supple.      Right lower leg: No edema.      Left lower leg: No edema.   Lymphadenopathy:      Cervical: No cervical adenopathy.   Skin:     General: Skin is warm and dry.      Findings: No rash.   Neurological:      General: No focal deficit present.      Mental Status: He is alert and oriented to person, place, and time. Mental status is at baseline.      GCS: GCS eye subscore is 4. GCS verbal subscore is 5. GCS motor subscore is 6.      Motor: No weakness.      Gait: Gait is intact.   Psychiatric:         Attention and Perception: Attention normal.         Mood and Affect: Mood and affect normal.         Speech: Speech normal.          Results  Imaging  CT scan done on March 23, 2022 showed a 3 mm nodule of the left major fissure of the left lower lobe, unchanged from his previous image. CT of his chest in October 2022 did not show any metastatic disease, no lymphadenopathy, no small fissure, lymph node along the left major fissure, which is stable. New nodular opacity in the right lobe adjacent to the pleura which measured 12 to 13 mm in size. Repeat chest CT showed an increase in size now at 15 mm to the right base. Nodular opacity on the image was shown again the 3 mm left major fissure nodule is unchanged.      Assessment and Plan:   The following treatment plan was discussed:     Assessment & Plan  1. Chronic, stable malignant neoplasm of the rectum.  The patient is advised to persist with the follow-up appointments with the gastroenterology department.    2. Primary malignant neoplasm of the prostate.  Chronic, stable.  A comprehensive review of the patient's past several CT scans that were conducted at the [facility].    3. Nodule of the lower lobe of the right lung.  Chronic, unstable.  Given the new onset of a lobe, it is recommended that the patient consult with our nurse navigator to determine if this meets the qualifications for a  biopsy. A referral to pulmonary medicine has been made.    4. History of prediabetes.  Chronic, presumed stable  A comprehensive CBC, CMP, GFR, and A1c have been recommended.    5. Pure hypercholesterolemia.  Chronic, presumed stable  The patient is not currently on any medication for this condition. A lipid profile has been recommended as it has not been conducted in some time.    Follow-up  The patient is scheduled for a follow-up visit in 1 year.  Dakota was seen today for establish care.    Diagnoses and all orders for this visit:    Encounter for medical examination to establish care    Malignant neoplasm of rectum (HCC)  -     CEA; Future    Primary malignant neoplasm of prostate (HCC)  -     PROSTATE SPECIFIC AG DIAGNOSTIC; Future    Nodule of lower lobe of right lung  -     Referral to Pulmonary and Sleep Medicine  -     Cancel: REFERRAL TO ONCOLOGY NURSE NAVIGATOR  -     REFERRAL TO ONCOLOGY NURSE NAVIGATOR    Prediabetes  -     CBC WITH DIFFERENTIAL; Future  -     Comp Metabolic Panel; Future  -     ESTIMATED GFR; Future  -     Lipid Profile; Future    Pure hypercholesterolemia  -     Comp Metabolic Panel; Future  -     ESTIMATED GFR; Future  -     Lipid Profile; Future    Thyroid disorder screening  -     TSH+FREE T4        Followup: Return if symptoms worsen or fail to improve.      Please note that this dictation was created using voice recognition software. I have made every reasonable attempt to correct obvious errors, but I expect that there are errors of grammar and possibly content that I did not discover before finalizing the note.

## 2024-05-03 LAB — CEA SERPL-MCNC: 3.1 NG/ML (ref 0–3)

## 2024-05-09 DIAGNOSIS — C20 MALIGNANT NEOPLASM OF RECTUM (HCC): ICD-10-CM

## 2024-05-09 DIAGNOSIS — R91.1 NODULE OF LOWER LOBE OF RIGHT LUNG: ICD-10-CM

## 2024-05-09 DIAGNOSIS — C61 PRIMARY MALIGNANT NEOPLASM OF PROSTATE (HCC): ICD-10-CM

## 2024-06-06 ENCOUNTER — APPOINTMENT (OUTPATIENT)
Dept: HEMATOLOGY ONCOLOGY | Facility: MEDICAL CENTER | Age: 74
End: 2024-06-06
Payer: MEDICARE

## 2024-06-13 ENCOUNTER — TELEPHONE (OUTPATIENT)
Dept: HEALTH INFORMATION MANAGEMENT | Facility: OTHER | Age: 74
End: 2024-06-13
Payer: MEDICARE

## 2024-06-24 ENCOUNTER — HOSPITAL ENCOUNTER (OUTPATIENT)
Dept: HEMATOLOGY ONCOLOGY | Facility: MEDICAL CENTER | Age: 74
End: 2024-06-24
Attending: NURSE PRACTITIONER
Payer: MEDICARE

## 2024-06-24 VITALS
RESPIRATION RATE: 16 BRPM | TEMPERATURE: 97.8 F | WEIGHT: 239.42 LBS | OXYGEN SATURATION: 95 % | SYSTOLIC BLOOD PRESSURE: 138 MMHG | DIASTOLIC BLOOD PRESSURE: 78 MMHG | BODY MASS INDEX: 31.73 KG/M2 | HEART RATE: 57 BPM | HEIGHT: 73 IN

## 2024-06-24 DIAGNOSIS — Z85.46 HISTORY OF PROSTATE CANCER: ICD-10-CM

## 2024-06-24 DIAGNOSIS — Z85.048 HISTORY OF RECTAL CANCER: ICD-10-CM

## 2024-06-24 DIAGNOSIS — R91.1 LUNG NODULE: ICD-10-CM

## 2024-06-24 ASSESSMENT — FIBROSIS 4 INDEX: FIB4 SCORE: 2.05

## 2024-06-24 ASSESSMENT — PAIN SCALES - GENERAL: PAINLEVEL: NO PAIN

## 2024-06-24 NOTE — PROGRESS NOTES
"Subjective     Dakota Tsang I is a 73 y.o. male who presents with New Patient (IOC/[facility]/Nodule of lower lobe of right lung /PAULINE DEAL)          HPI    Patient referred to me, Intake Oncology Coordinator by his PCP CLARA Li for lung nodule with history of cancer.  Patient is unaccompanied for today's visit.    Patient with a significant history of rectal adenocarcinoma and prostate cancer diagnosed in 2017 and treated at the [facility] in [place], [place] by an oncologist there.  He had been having follow-up with his oncologist with serial imaging.  Imaging completed in October 2013 on CT chest showed new nodular opacity in the right lower lobe.  Repeat imaging in February 2024 showed no evidence to suggest of new or progressive malignancy within the lung.  The subpleural consolidation/nodular opacity in the right base is presumably to be postinflammatory.  I was able to review the medical record from his oncologist at the [facility] on 2/1/2024 and they did follow-up with their pulmonary team recommending just to continue to monitor with repeat imaging of CT chest in 3-6 months.    Patient lives here in [place] and is a .  Referral was placed here and according to PCP patient was wanting a second opinion.  Patient presented to the clinic today uncertain of the need for appointment today.  He was aware that there was abnormal finding on the lung base but did not believe that it was anything related to his cancer.  He is requesting to be seen by a pulmonologist.        ROS           Objective     /78 (BP Location: Right arm, Patient Position: Sitting, BP Cuff Size: Adult)   Pulse (!) 57   Temp 36.6 °C (97.8 °F) (Temporal)   Resp 16   Ht 1.86 m (6' 1.23\")   Wt 109 kg (239 lb 6.7 oz)   SpO2 95%   BMI 31.39 kg/m²      Physical Exam         CT-CHEST (THORAX) W/O  Order: [id number]  02/01/2024  Impression    No evidence to suggest new or progressive malignancy within the " thorax. Subpleural consolidation, nodular, at the right base is presumably postinflammatory and could be related to fungal infection.    Narrative    EXAM: CT CHEST WITHOUT IV CONTRAST    TECHNIQUE:    3D maximum intensity projection (MIP) images were created on a dependent workstation as ordered by the treating provider and reviewed by the radiologist to increase sensitivity for detection of pulmonary nodules.    CT CHEST WITHOUT IV CONTRAST    COMPARISON: 10/3/2023.    Mediastinum: No clear new hilar or mediastinal lymph node enlargement prominent distal lobar or proximal segmental level lymph node around image 109 the right base less well seen given the lack of use of intravenous contrast. No significant pericardial  abnormality. . .    Pleura: No significant pleural abnormality    Abdomen: No significant upper abdominal findings. Please see dedicated abdominal report where applicable.    Lungs: Subpleural 15 mm right base nodular opacity on image 108 is again noted and unchanged... Juxtapleural 3 mm left major fissure nodule is unchanged image 84    Chest Wall: No significant axillary lymph node enlargement. .        CT-CHEST (THORAX) WITH  Order: [id number]  10/03/2023  Impression    New nodular opacity in the right lower lobe adjacent to the pleura, with borderline peribronchial  lymph nodes as compared with October 18, 2022. Adjacent linear scarring inferiorly, and findings  could be potentially postinfectious or postinflammatory but technically indeterminate. Short  interval follow-up may be performed to ensure stability/resolution. If there is interval growth or  does not resolve  PET CT may be considered.    Narrative    EXAM: CT CHEST WITH IV CONTRAST    TECHNIQUE: CT Chest with intravenous contrast according to routine protocol.    COMPARISON: October 18, 2022    FINDINGS:    Medical Devices: None    Airways/Lungs/Pleura: Nodular opacity noted within the right lower lobe measures 12 to 13 mm (image  109,  series 3 corresponding to image 125, series 4). This is new since prior chest CT. No lobar  consolidations. No pleural effusion or pneumothorax.    Lower Neck/Axilla/Chest Wall: No supraclavicular lymphadenopathy, no concerning thyroid nodules or  masses. No axillary lymphadenopathy, no chest wall mass.    Mediastinum/Kena: Mildly prominent peribronchial lymph node in the right lower lobe measuring 8 mm  (image entered and 11, series 3), new since prior exam. Additional smaller peribronchial lymph nodes  more proximally measuring 3 to 4 mm (images 107, 105 series 3). Subcentimeter hilar and mediastinal  lymph nodes, not enlarged by CT criteria.    Aorta/Pulmonary Arteries: Thoracic aorta appears stable in course and caliber. Central pulmonary  arteries appear stable in course and caliber.    Heart/Pericardium: Stable cardiac chambers. No significant pericardial thickening or pericardial  effusion.    Upper Abdomen: Limited visualization of the upper abdomen appears unremarkable.    Bones: No acute appearing fractures or dislocations. No concerning lytic or blastic osseous lesions  are seen.                 Assessment & Plan       1. History of rectal cancer        2. History of prostate cancer        3. Lung nodule                After further discussion patient interested in being seen by pulmonary.  He did not feel necessary to proceed with oncology evaluation.  He was informed that the lung nodule did not appear to be malignant and would like second opinion from pulmonary.  He has been referred to pulmonologist already by his primary care provider but not due to be seen until November 2024.  I will contact our pulmonary team to see if they can accommodate and get him in sooner.    Therefore no further workup will be completed today.  Patient will not establish care here at this time.  No need to establish with medical oncology either due to his remote history of cancer at this time.    Patient is in agreement  with the plan and appreciative of the help to get him in to be seen by pulmonary sooner.       Please note that this dictation was created using voice recognition software. I have made every reasonable attempt to correct obvious errors, but I expect that there are errors of grammar and possibly content that I did not discover before finalizing the note.

## 2024-11-05 ENCOUNTER — APPOINTMENT (OUTPATIENT)
Dept: SLEEP MEDICINE | Facility: MEDICAL CENTER | Age: 74
End: 2024-11-05
Attending: FAMILY MEDICINE
Payer: MEDICARE

## 2024-12-03 ENCOUNTER — TELEPHONE (OUTPATIENT)
Dept: HEALTH INFORMATION MANAGEMENT | Facility: OTHER | Age: 74
End: 2024-12-03
Payer: MEDICARE

## 2025-02-06 ENCOUNTER — OFFICE VISIT (OUTPATIENT)
Dept: MEDICAL GROUP | Facility: MEDICAL CENTER | Age: 75
End: 2025-02-06
Payer: MEDICARE

## 2025-02-06 VITALS
BODY MASS INDEX: 32.07 KG/M2 | WEIGHT: 242 LBS | HEIGHT: 73 IN | OXYGEN SATURATION: 94 % | DIASTOLIC BLOOD PRESSURE: 62 MMHG | TEMPERATURE: 97.7 F | SYSTOLIC BLOOD PRESSURE: 132 MMHG | HEART RATE: 79 BPM

## 2025-02-06 DIAGNOSIS — R91.1 NODULE OF LOWER LOBE OF RIGHT LUNG: ICD-10-CM

## 2025-02-06 DIAGNOSIS — R09.89 CHEST CONGESTION: ICD-10-CM

## 2025-02-06 PROCEDURE — 3075F SYST BP GE 130 - 139MM HG: CPT | Performed by: FAMILY MEDICINE

## 2025-02-06 PROCEDURE — 99213 OFFICE O/P EST LOW 20 MIN: CPT | Performed by: FAMILY MEDICINE

## 2025-02-06 PROCEDURE — 3078F DIAST BP <80 MM HG: CPT | Performed by: FAMILY MEDICINE

## 2025-02-06 ASSESSMENT — FIBROSIS 4 INDEX: FIB4 SCORE: 2.08

## 2025-02-06 ASSESSMENT — PATIENT HEALTH QUESTIONNAIRE - PHQ9: CLINICAL INTERPRETATION OF PHQ2 SCORE: 0

## 2025-02-06 NOTE — PROGRESS NOTES
Verbal consent was acquired by the patient to use Photos to Photos ambient listening note generation during this visit:  Yes      Chief complaint::Diagnoses of Nodule of lower lobe of right lung and Chest congestion were pertinent to this visit.    Assessment and Plan:   The following treatment plan was discussed:     Assessment & Plan  1. Lung nodule: Chronic, presumed stable. Subpleural 15 mm right base nodule. Previous MRIs and CT scans monitored this nodule.  - Referrals placed to pulmonary medicine at [facility] in [place], [place]  - Referral to lung nodule clinic for further evaluation and management  - Monitor MyChart for referral messages  - Clinic will contact if referral issues arise  - Offered to order a follow-up CT scan, patient declined at this time    Follow-up  - Follow up as needed  Dakota was seen today for referral needed.    Diagnoses and all orders for this visit:    Nodule of lower lobe of right lung  -     Referral to Pulmonary and Sleep Medicine  -     Referral to Pulmonary and Sleep Medicine    Chest congestion  -     Referral to Pulmonary and Sleep Medicine        Followup: Return if symptoms worsen or fail to improve.    Subjective/HPI:   HPI:    Dakota Tsang I is a pleasant 74 y.o. male here for   Chief Complaint   Patient presents with    Referral Needed     Respiratory Specialist for mucus buildup        History of Present Illness  The patient is a 74-year-old individual with concerns over mucus production.    They have had persistent mucus buildup for several years, attributed to a respiratory infection. Their colorectal cancer treatment is complete, but they were advised to undergo MRIs for a suspected chest infection. Despite 3 MRIs, the cause remains undetermined. They were referred to a pulmonary specialist but have not been contacted for an appointment. They describe mucus from their chest, a constant cough, and intermittent raspiness in their voice. 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 They have no history of asthma and are concerned about potential cancer recurrence. They underwent a physical exam yesterday and want their respiratory system, eyes, ears, nose, and throat evaluated. They decline further x-rays. They contracted COVID-19 in December 2021 and question if this is related to their lymph node issues.    Supplemental Information  They report a decline in overall health post-cancer treatment, including fatigue after climbing stairs and neuropathy in their feet and hands.    Current Medicines (including changes today)  No current outpatient medications on file.     No current facility-administered medications for this visit.     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  He  has a past surgical history that includes colon resection.       Objective:   /62 (BP Location: Left arm, Patient Position: Sitting, BP Cuff Size: Large adult)   Pulse 79   Temp 36.5 °C (97.7 °F) (Temporal)   Ht 1.854 m (6' 1\")   Wt 110 kg (242 lb)   SpO2 94%  Body mass index is 31.93 kg/m².    Physical Exam  Constitutional:       General: He is not in acute distress.     Appearance: He is not ill-appearing or toxic-appearing.   HENT:      Head: Normocephalic.      Right Ear: Tympanic membrane and external ear normal.      Left Ear: Tympanic membrane and external ear normal.      Nose: Nose normal. No rhinorrhea.      Mouth/Throat:      Mouth: Mucous membranes are moist.      Pharynx: Oropharynx is clear. No posterior oropharyngeal erythema.   Eyes:      General:         Right eye: No discharge.         Left eye: No discharge.      Conjunctiva/sclera: Conjunctivae normal.      Pupils: Pupils are equal, round, and reactive to light.   Cardiovascular:      Rate and Rhythm: Normal rate and regular rhythm.      Heart " sounds: No murmur heard.  Pulmonary:      Effort: Pulmonary effort is normal. No respiratory distress.      Breath sounds: Normal breath sounds. No wheezing.   Abdominal:      General: Abdomen is flat.   Musculoskeletal:         General: No swelling or tenderness.      Cervical back: Normal range of motion and neck supple.      Right lower leg: No edema.      Left lower leg: No edema.   Skin:     General: Skin is warm.   Neurological:      General: No focal deficit present.      Mental Status: He is alert and oriented to person, place, and time. Mental status is at baseline.   Psychiatric:         Mood and Affect: Mood normal.          Lab/ Imaging Results:  Results  - Imaging:    - Lung CT scan: Subpleural 15 mm right base nodule identified    Please note that this dictation was created using voice recognition software. I have made every reasonable attempt to correct obvious errors, but I expect that there are errors of grammar and possibly content that I did not discover before finalizing the note.

## 2025-02-12 NOTE — Clinical Note
REFERRAL APPROVAL NOTICE         Sent on February 12, 2025                   D[place] Trinh ERVIN  [address]  [place] [address]                   Dear Mr. Trinh ERVIN,    After a careful review of the medical information and benefit coverage, [facility] has processed your referral. See below for additional details.    If applicable, you must be actively enrolled with your insurance for coverage of the authorized service. If you have any questions regarding your coverage, please contact your insurance directly.    REFERRAL INFORMATION   Referral #:  [id number]  Referred-To Provider    Referred-By Provider:  Pulmonary Medicine    JOSE Schroeder   [facility]      [address]  [address]  [place] [address]  [phone] [address]  [facility] [address] [phone]    Referral Start Date:  02/06/2025  Referral End Date:   02/06/2026             SCHEDULING  If you do not already have an appointment, please call [phone] to make an appointment.     MORE INFORMATION  If you do not already have a [employer] account, sign up at: [email or web address]  You can access your medical information, make appointments, see lab results, billing information, and more.  If you have questions regarding this referral, please contact  the [facility] Referrals department at:             [phone]. Monday - Friday 8:00AM - 5:00PM.     Sincerely,    [facility]